# Patient Record
Sex: MALE | Race: BLACK OR AFRICAN AMERICAN | NOT HISPANIC OR LATINO | URBAN - METROPOLITAN AREA
[De-identification: names, ages, dates, MRNs, and addresses within clinical notes are randomized per-mention and may not be internally consistent; named-entity substitution may affect disease eponyms.]

---

## 2017-09-27 ENCOUNTER — EMERGENCY (EMERGENCY)
Facility: HOSPITAL | Age: 22
LOS: 1 days | Discharge: HOME | End: 2017-09-27
Attending: EMERGENCY MEDICINE
Payer: SELF-PAY

## 2017-09-27 VITALS
SYSTOLIC BLOOD PRESSURE: 118 MMHG | HEART RATE: 99 BPM | OXYGEN SATURATION: 99 % | DIASTOLIC BLOOD PRESSURE: 73 MMHG | RESPIRATION RATE: 18 BRPM

## 2017-09-27 VITALS
DIASTOLIC BLOOD PRESSURE: 80 MMHG | SYSTOLIC BLOOD PRESSURE: 148 MMHG | TEMPERATURE: 99 F | RESPIRATION RATE: 20 BRPM | WEIGHT: 250 LBS | HEART RATE: 85 BPM | HEIGHT: 71 IN | OXYGEN SATURATION: 99 %

## 2017-09-27 PROCEDURE — 99053 MED SERV 10PM-8AM 24 HR FAC: CPT

## 2017-09-27 PROCEDURE — 93010 ELECTROCARDIOGRAM REPORT: CPT

## 2017-09-27 PROCEDURE — 99284 EMERGENCY DEPT VISIT MOD MDM: CPT | Mod: 25

## 2017-09-27 PROCEDURE — 99283 EMERGENCY DEPT VISIT LOW MDM: CPT | Mod: 25

## 2017-09-27 PROCEDURE — 71046 X-RAY EXAM CHEST 2 VIEWS: CPT

## 2017-09-27 PROCEDURE — 71020: CPT | Mod: 26

## 2017-09-27 PROCEDURE — 93005 ELECTROCARDIOGRAM TRACING: CPT

## 2017-09-27 RX ORDER — ALBUTEROL 90 UG/1
2.5 AEROSOL, METERED ORAL ONCE
Qty: 0 | Refills: 0 | Status: DISCONTINUED | OUTPATIENT
Start: 2017-09-27 | End: 2017-09-27

## 2017-09-27 RX ORDER — IBUPROFEN 200 MG
1 TABLET ORAL
Qty: 15 | Refills: 0 | OUTPATIENT
Start: 2017-09-27 | End: 2017-10-02

## 2017-09-27 RX ORDER — IBUPROFEN 200 MG
600 TABLET ORAL ONCE
Qty: 0 | Refills: 0 | Status: COMPLETED | OUTPATIENT
Start: 2017-09-27 | End: 2017-09-27

## 2017-09-27 RX ADMIN — Medication 600 MILLIGRAM(S): at 06:42

## 2017-09-27 NOTE — ED PROVIDER NOTE - OBJECTIVE STATEMENT
PT with PMHx Asthma presents to the ED with chest pain that started late last night. Pt states that he was very busy yesterday moving furniture, PT Shilo angina, SOB, LANG, PND, calf pain, Hx of DVT, hormone use.

## 2017-09-27 NOTE — ED PROVIDER NOTE - MUSCULOSKELETAL, MLM
Reproduce diffuse chest pain of B/L pectoral muscles, Spine appears normal, range of motion is not limited, joint tenderness

## 2017-09-27 NOTE — ED PROVIDER NOTE - ATTENDING CONTRIBUTION TO CARE
I personally saw the patient with the PA, and completed the key components of the history and physical exam. I then discussed the management plan with the PA    + wheezing, sob> diffuse wheezing on exam: plan - nebs, steroids,

## 2017-09-27 NOTE — ED PROVIDER NOTE - PROGRESS NOTE DETAILS
PA NOTE: PT PERC NEG. PA NOTE: PT seen resting comfortably in no acute distress, no acute complaints at this time, PT tolerating PO intake,  PT informed of all results, pt informed of possibility of change in radiology read after official read, PT states that pain improved with treatment in ED,  PT will be DC home with IBU for pain control instructed to rest follow up PCP, educated about when to return to the ED if needed. PT verbalizes that he understands all instructions and results.

## 2017-09-27 NOTE — ED ADULT NURSE NOTE - OBJECTIVE STATEMENT
patient states that he has chest pain which started at 11pm last night, patient states that he has chest pain which started at 11pm last night, patient denies any trauma to chest, states that he was lifting furniture a few days ago, patient denies any shortness of breath, appears to be comfortable at this time, no swelling to his legs noted bilaterally,

## 2019-04-08 ENCOUNTER — EMERGENCY (EMERGENCY)
Facility: HOSPITAL | Age: 24
LOS: 1 days | Discharge: HOME | End: 2019-04-08
Attending: EMERGENCY MEDICINE
Payer: MEDICAID

## 2019-04-08 VITALS
DIASTOLIC BLOOD PRESSURE: 82 MMHG | HEART RATE: 68 BPM | SYSTOLIC BLOOD PRESSURE: 133 MMHG | TEMPERATURE: 98 F | OXYGEN SATURATION: 98 % | WEIGHT: 270.07 LBS | HEIGHT: 71 IN | RESPIRATION RATE: 16 BRPM

## 2019-04-08 PROBLEM — K58.9 IRRITABLE BOWEL SYNDROME WITHOUT DIARRHEA: Chronic | Status: ACTIVE | Noted: 2017-09-27

## 2019-04-08 PROCEDURE — 71046 X-RAY EXAM CHEST 2 VIEWS: CPT

## 2019-04-08 PROCEDURE — 99284 EMERGENCY DEPT VISIT MOD MDM: CPT

## 2019-04-08 PROCEDURE — 93005 ELECTROCARDIOGRAM TRACING: CPT

## 2019-04-08 PROCEDURE — 93010 ELECTROCARDIOGRAM REPORT: CPT

## 2019-04-08 PROCEDURE — 99283 EMERGENCY DEPT VISIT LOW MDM: CPT

## 2019-04-08 PROCEDURE — 71046 X-RAY EXAM CHEST 2 VIEWS: CPT | Mod: 26

## 2019-04-08 RX ORDER — IBUPROFEN 200 MG
600 TABLET ORAL ONCE
Qty: 0 | Refills: 0 | Status: COMPLETED | OUTPATIENT
Start: 2019-04-08 | End: 2019-04-08

## 2019-04-08 RX ORDER — METHOCARBAMOL 500 MG/1
500 TABLET, FILM COATED ORAL ONCE
Qty: 0 | Refills: 0 | Status: COMPLETED | OUTPATIENT
Start: 2019-04-08 | End: 2019-04-08

## 2019-04-08 RX ADMIN — METHOCARBAMOL 500 MILLIGRAM(S): 500 TABLET, FILM COATED ORAL at 10:04

## 2019-04-08 RX ADMIN — Medication 600 MILLIGRAM(S): at 10:04

## 2019-04-08 NOTE — ED STATDOCS - PHYSICAL EXAMINATION
VITAL SIGNS: I have reviewed nursing notes and confirm.  CONSTITUTIONAL: Well-developed; well-nourished; in no acute distress.  SKIN: Skin exam is warm and dry, no acute rash.  HEAD: Normocephalic; atraumatic.  EYES: PERRL, EOM intact; conjunctiva and sclera clear.  ENT: No nasal discharge; airway clear. Throat clear.  NECK: Supple; non tender.    CARD: S1, S2 normal; no murmurs, gallops, or rubs. Regular rate and rhythm.  RESP: No wheezes,  no rales or rhonchi.   ABD:  soft; non-distended; non-tender;   EXT: No clubbing, cyanosis or edema. + R paraspinal tenderness. FROM at shoulder.   NEURO: Alert, oriented. Grossly unremarkable. No focal deficits. no facial droop, moves all extremities  PSYCH: Cooperative, appropriate.

## 2019-04-08 NOTE — ED STATDOCS - OBJECTIVE STATEMENT
22 y/o M presents to ED with Mom c/o sharp back pain with intermittent radiation to the chest onset this morning while driving. Notes the pain started shortly after helping move his grandfather into his bed, pain worse with movement of UE and deep breathing. Denies fevers or chills, shortness of breath, abdominal pain, n/v/d or recent cough/cold/congestion. NKDA. Did not take medications OTC for symptoms. Smokes cigarettes daily. No further acute complaints at this time.

## 2019-04-08 NOTE — ED ADULT TRIAGE NOTE - CHIEF COMPLAINT QUOTE
Pt with upper back pain that started an hour ago. Pt did not take anything for the pain PTA. Denies any injury

## 2019-04-08 NOTE — ED STATDOCS - CLINICAL SUMMARY MEDICAL DECISION MAKING FREE TEXT BOX
Patient with recent heavy lifting, now with R paraspinal back pain that radiates to the chest, likely MSK pain. check EKG, CXR, given Ibuprofen, Robaxin and re-evaluate

## 2019-04-08 NOTE — ED STATDOCS - ATTENDING CONTRIBUTION TO CARE
I, Vineet Ingram, performed the initial face to face bedside interview with this patient regarding history of present illness, review of symptoms and relevant past medical, social and family history.  I completed an independent physical examination.  I was the initial provider who evaluated this patient. I have signed out the follow up of any pending tests (i.e. labs, radiological studies) to the ACP.  I have communicated the patient’s plan of care and disposition with the ACP.

## 2019-04-08 NOTE — ED ADULT NURSE NOTE - OBJECTIVE STATEMENT
22 y/o male with no PMH/PSH presents to ED with sx of right shoulder pain since this morning. Pt. has posterior right shoulder pain and anterior chest wall pain that is 10/10 as per patient. He was moving his grandfather in bed yesterday and may have injured it then as per patient. He denies any SOB, N/V/D, recent URI.

## 2019-04-08 NOTE — ED STATDOCS - NS ED ROS FT
Review of Systems  •	CONSTITUTIONAL - no  fever, no diaphoresis, no weight change  •	SKIN - no rash  •	HEMATOLOGIC - no bleeding, no bruising  •	EYES - no eye pain, no blurred vision  •	ENT - no change in hearing, no pain  •	RESPIRATORY - no shortness of breath, no cough  •	CARDIAC - (+) chest discomfort, no palpitations  •	GI - no abd pain, no nausea, no vomiting, no diarrhea, no constipation, no bleeding  •	GENITO-URINARY - no discharge, no dysuria; no hematuria,   •	ENDO - no polydypsia, no polyurea, no heat/no cold intolerance  •	MUSCULOSKELETAL - (+) back pain, no swelling, no redness  •	NEUROLOGIC - no weakness, no headache, no anesthesia, no paresthesias  •	PSYCH - no anxiety, non suicidal, non homicidal, no hallucination, no depression

## 2019-05-17 NOTE — ED ADULT NURSE NOTE - NSHOSCREENINGQ1_ED_ALL_ED
If you are a smoker, it is important for your health to stop smoking. Please be aware that second hand smoke is also harmful. No

## 2021-01-05 ENCOUNTER — TRANSCRIPTION ENCOUNTER (OUTPATIENT)
Age: 26
End: 2021-01-05

## 2023-01-26 NOTE — ED ADULT NURSE NOTE - NS ED NURSE LEVEL OF CONSCIOUSNESS AFFECT
Patient called regarding medication, patient didn't answer.  ----- Message from Zoie Kuhn MA sent at 1/26/2023 10:43 AM CST -----  Type:  Patient Returning Call    Who Called: pt  Does the patient know what this is regarding?: yes  Would the patient rather a call back or a response via [facility]? Call back  Best Call Back Number: [phone]  Additional Information:  please contact pt regarding prescription that needs to be sent for medication not covered by insurance       
Please see the attached refill request.    Please print RX so I may submit to company     
Calm